# Patient Record
Sex: FEMALE | URBAN - METROPOLITAN AREA
[De-identification: names, ages, dates, MRNs, and addresses within clinical notes are randomized per-mention and may not be internally consistent; named-entity substitution may affect disease eponyms.]

---

## 2020-06-03 ENCOUNTER — APPOINTMENT (OUTPATIENT)
Dept: URBAN - METROPOLITAN AREA CLINIC 273 | Age: 20
Setting detail: DERMATOLOGY
End: 2020-06-03

## 2020-06-03 DIAGNOSIS — L70.0 ACNE VULGARIS: ICD-10-CM

## 2020-06-03 PROCEDURE — 99213 OFFICE O/P EST LOW 20 MIN: CPT

## 2020-06-03 PROCEDURE — OTHER PRESCRIPTION: OTHER

## 2020-06-03 RX ORDER — TRETIONIN 0.5 MG/G
CREAM TOPICAL
Qty: 1 | Refills: 11 | Status: ERX | COMMUNITY
Start: 2020-06-03

## 2022-08-21 ENCOUNTER — EMERGENCY (EMERGENCY)
Facility: HOSPITAL | Age: 22
LOS: 1 days | Discharge: HOME | End: 2022-08-21
Attending: EMERGENCY MEDICINE | Admitting: EMERGENCY MEDICINE

## 2022-08-21 VITALS
HEIGHT: 65 IN | OXYGEN SATURATION: 96 % | RESPIRATION RATE: 20 BRPM | SYSTOLIC BLOOD PRESSURE: 136 MMHG | DIASTOLIC BLOOD PRESSURE: 89 MMHG | HEART RATE: 83 BPM | WEIGHT: 250 LBS | TEMPERATURE: 99 F

## 2022-08-21 VITALS
TEMPERATURE: 98 F | OXYGEN SATURATION: 98 % | SYSTOLIC BLOOD PRESSURE: 121 MMHG | DIASTOLIC BLOOD PRESSURE: 71 MMHG | HEART RATE: 77 BPM | RESPIRATION RATE: 18 BRPM

## 2022-08-21 DIAGNOSIS — Z20.822 CONTACT WITH AND (SUSPECTED) EXPOSURE TO COVID-19: ICD-10-CM

## 2022-08-21 DIAGNOSIS — T42.4X2A POISONING BY BENZODIAZEPINES, INTENTIONAL SELF-HARM, INITIAL ENCOUNTER: ICD-10-CM

## 2022-08-21 DIAGNOSIS — F43.10 POST-TRAUMATIC STRESS DISORDER, UNSPECIFIED: ICD-10-CM

## 2022-08-21 DIAGNOSIS — F32.A DEPRESSION, UNSPECIFIED: ICD-10-CM

## 2022-08-21 DIAGNOSIS — R45.851 SUICIDAL IDEATIONS: ICD-10-CM

## 2022-08-21 DIAGNOSIS — F17.210 NICOTINE DEPENDENCE, CIGARETTES, UNCOMPLICATED: ICD-10-CM

## 2022-08-21 DIAGNOSIS — F41.9 ANXIETY DISORDER, UNSPECIFIED: ICD-10-CM

## 2022-08-21 DIAGNOSIS — F12.99 CANNABIS USE, UNSPECIFIED WITH UNSPECIFIED CANNABIS-INDUCED DISORDER: ICD-10-CM

## 2022-08-21 LAB
ALBUMIN SERPL ELPH-MCNC: 3.5 G/DL — SIGNIFICANT CHANGE UP (ref 3.4–5)
ALP SERPL-CCNC: 103 U/L — SIGNIFICANT CHANGE UP (ref 40–120)
ALT FLD-CCNC: 21 U/L — SIGNIFICANT CHANGE UP (ref 12–42)
AMPHET UR-MCNC: NEGATIVE — SIGNIFICANT CHANGE UP
ANION GAP SERPL CALC-SCNC: 8 MMOL/L — LOW (ref 9–16)
APAP SERPL-MCNC: 2 UG/ML — LOW (ref 10–30)
APPEARANCE UR: CLEAR — SIGNIFICANT CHANGE UP
AST SERPL-CCNC: 20 U/L — SIGNIFICANT CHANGE UP (ref 15–37)
BARBITURATES UR SCN-MCNC: NEGATIVE — SIGNIFICANT CHANGE UP
BASOPHILS # BLD AUTO: 0.08 K/UL — SIGNIFICANT CHANGE UP (ref 0–0.2)
BASOPHILS NFR BLD AUTO: 0.7 % — SIGNIFICANT CHANGE UP (ref 0–2)
BENZODIAZ UR-MCNC: NEGATIVE — SIGNIFICANT CHANGE UP
BILIRUB SERPL-MCNC: 0.2 MG/DL — SIGNIFICANT CHANGE UP (ref 0.2–1.2)
BILIRUB UR-MCNC: NEGATIVE — SIGNIFICANT CHANGE UP
BUN SERPL-MCNC: 12 MG/DL — SIGNIFICANT CHANGE UP (ref 7–23)
CALCIUM SERPL-MCNC: 9.1 MG/DL — SIGNIFICANT CHANGE UP (ref 8.5–10.5)
CHLORIDE SERPL-SCNC: 106 MMOL/L — SIGNIFICANT CHANGE UP (ref 96–108)
CO2 SERPL-SCNC: 23 MMOL/L — SIGNIFICANT CHANGE UP (ref 22–31)
COCAINE METAB.OTHER UR-MCNC: NEGATIVE — SIGNIFICANT CHANGE UP
COLOR SPEC: YELLOW — SIGNIFICANT CHANGE UP
CREAT SERPL-MCNC: 0.72 MG/DL — SIGNIFICANT CHANGE UP (ref 0.5–1.3)
DIFF PNL FLD: NEGATIVE — SIGNIFICANT CHANGE UP
EGFR: 120 ML/MIN/1.73M2 — SIGNIFICANT CHANGE UP
EOSINOPHIL # BLD AUTO: 0.33 K/UL — SIGNIFICANT CHANGE UP (ref 0–0.5)
EOSINOPHIL NFR BLD AUTO: 2.8 % — SIGNIFICANT CHANGE UP (ref 0–6)
ETHANOL SERPL-MCNC: <3 MG/DL — SIGNIFICANT CHANGE UP
GLUCOSE SERPL-MCNC: 88 MG/DL — SIGNIFICANT CHANGE UP (ref 70–99)
GLUCOSE UR QL: NEGATIVE — SIGNIFICANT CHANGE UP
HCG SERPL-ACNC: <1 MIU/ML — SIGNIFICANT CHANGE UP
HCT VFR BLD CALC: 40.4 % — SIGNIFICANT CHANGE UP (ref 34.5–45)
HGB BLD-MCNC: 12.8 G/DL — SIGNIFICANT CHANGE UP (ref 11.5–15.5)
IMM GRANULOCYTES NFR BLD AUTO: 0.3 % — SIGNIFICANT CHANGE UP (ref 0–1.5)
KETONES UR-MCNC: NEGATIVE — SIGNIFICANT CHANGE UP
LEUKOCYTE ESTERASE UR-ACNC: NEGATIVE — SIGNIFICANT CHANGE UP
LYMPHOCYTES # BLD AUTO: 3.56 K/UL — HIGH (ref 1–3.3)
LYMPHOCYTES # BLD AUTO: 30 % — SIGNIFICANT CHANGE UP (ref 13–44)
MCHC RBC-ENTMCNC: 26.7 PG — LOW (ref 27–34)
MCHC RBC-ENTMCNC: 31.7 GM/DL — LOW (ref 32–36)
MCV RBC AUTO: 84.2 FL — SIGNIFICANT CHANGE UP (ref 80–100)
METHADONE UR-MCNC: NEGATIVE — SIGNIFICANT CHANGE UP
MONOCYTES # BLD AUTO: 0.57 K/UL — SIGNIFICANT CHANGE UP (ref 0–0.9)
MONOCYTES NFR BLD AUTO: 4.8 % — SIGNIFICANT CHANGE UP (ref 2–14)
NEUTROPHILS # BLD AUTO: 7.3 K/UL — SIGNIFICANT CHANGE UP (ref 1.8–7.4)
NEUTROPHILS NFR BLD AUTO: 61.4 % — SIGNIFICANT CHANGE UP (ref 43–77)
NITRITE UR-MCNC: NEGATIVE — SIGNIFICANT CHANGE UP
NRBC # BLD: 0 /100 WBCS — SIGNIFICANT CHANGE UP (ref 0–0)
OPIATES UR-MCNC: NEGATIVE — SIGNIFICANT CHANGE UP
PCP SPEC-MCNC: SIGNIFICANT CHANGE UP
PCP UR-MCNC: NEGATIVE — SIGNIFICANT CHANGE UP
PH UR: 6 — SIGNIFICANT CHANGE UP (ref 5–8)
PLATELET # BLD AUTO: 368 K/UL — SIGNIFICANT CHANGE UP (ref 150–400)
POTASSIUM SERPL-MCNC: 4 MMOL/L — SIGNIFICANT CHANGE UP (ref 3.5–5.3)
POTASSIUM SERPL-SCNC: 4 MMOL/L — SIGNIFICANT CHANGE UP (ref 3.5–5.3)
PROT SERPL-MCNC: 7.6 G/DL — SIGNIFICANT CHANGE UP (ref 6.4–8.2)
PROT UR-MCNC: NEGATIVE MG/DL — SIGNIFICANT CHANGE UP
RBC # BLD: 4.8 M/UL — SIGNIFICANT CHANGE UP (ref 3.8–5.2)
RBC # FLD: 13.7 % — SIGNIFICANT CHANGE UP (ref 10.3–14.5)
SALICYLATES SERPL-MCNC: 1.7 MG/DL — LOW (ref 2.8–20)
SARS-COV-2 RNA SPEC QL NAA+PROBE: SIGNIFICANT CHANGE UP
SODIUM SERPL-SCNC: 137 MMOL/L — SIGNIFICANT CHANGE UP (ref 132–145)
SP GR SPEC: 1.01 — SIGNIFICANT CHANGE UP (ref 1–1.03)
THC UR QL: POSITIVE
TSH SERPL-MCNC: 0.58 UIU/ML — SIGNIFICANT CHANGE UP (ref 0.36–3.74)
UROBILINOGEN FLD QL: 0.2 E.U./DL — SIGNIFICANT CHANGE UP
WBC # BLD: 11.88 K/UL — HIGH (ref 3.8–10.5)
WBC # FLD AUTO: 11.88 K/UL — HIGH (ref 3.8–10.5)

## 2022-08-21 PROCEDURE — 93010 ELECTROCARDIOGRAM REPORT: CPT

## 2022-08-21 PROCEDURE — 90792 PSYCH DIAG EVAL W/MED SRVCS: CPT | Mod: 95

## 2022-08-21 PROCEDURE — 99218: CPT

## 2022-08-21 NOTE — ED PROVIDER NOTE - OBJECTIVE STATEMENT
21yo F hx of anxiety/ depression, on sertraline and klonopin, presents with attempt at self harm tonight.  Pt states she was upset about an interaction with her mother and her brother, and took 20 tablets of 0.5mg klonopin in her mouth, held them for a few minutes, then spit them out.  States some of the tablets were melted but not all.  Pt then put another approx 6 tablets in her mouth, but spit them out as well. 21yo F hx of anxiety/ depression, on sertraline and klonopin, presents with attempt at self harm tonight.  Pt states she was upset about an interaction with her mother and her brother, and took 20 tablets of 0.5mg klonopin in her mouth, held them for a few minutes, then spit them out.  States some of the tablets were melted but not all.  Pt then put another approx 6 tablets in her mouth, but spit them out as well.  Pt then called her psychiatrist who told her to go to ED.  No drugs or etoh.  No co ingestions.  No HI or AVH.  Does not want to end her life.

## 2022-08-21 NOTE — ED BEHAVIORAL HEALTH NOTE - BEHAVIORAL HEALTH NOTE
“Collateral (Bianca, mom and brother) has requested that the information provided remain confidential: Yes [  ] No [x  ]     Collateral (Bianca, mom and brother) has provided information that patient is/may be unaware of: Yes [  ] No [ x ]”             “Patient gives permission to obtain collateral from _____:     (  ) Yes     (  x)  No     Rationale for overriding objection               (  ) Lack of capacity. Details: ________               (  x) Assessing risk of danger to self/others. Details: __Pt was brought to ED for possible SA______            Rationale for selecting specific collateral source               ( x ) Potential to impact risk of danger to self/others and no alternative equivalent. Details: _Contacted pt’s mom who brought pt to the ED____”              ========================     FOR EACH COLLATERAL     ========================     NAME: Bianca     NUMBER: [phone]     RELATIONSHIP: Mother and Brother      RELIABILITY: Good     COMMENTS: Per collaterals, they deny having safety concerns and reports someone will be able to stay with pt once she is discharged. In addition, reports pt’s outside providers are aware and will be making a sooner appointment to be seen.      ========================     PATIENT DEMOGRAPHICS: Patient is a 22F, single, no children/non-caregiver, college graduate, unemployed, and domiciled alone.      ========================     HPI     BASELINE FUNCTIONING: Per collaterals, at baseline pt is “super functional, and motivated”.     DATE HPI STARTED: Per collaterals, withing the last 48 hours. Prior to Friday pt was doing well and there were no conflicts/stressors present.      DECOMPENSATION: Per collaterals, there are interpersonal conflicts with the pt and family (dad, mom and brother). Collaterals report on Friday pt got into a verbal “back and forth” with brother and then again on Saturday with mom. Per collaterals, they have been suggesting to go to family therapy to work on their relationship and conflicts, however pt is not agreeable and that was some of the context of the verbal back and forth this past weekend. Per collaterals, pt texted father and stated she took twenty .5mg of Klonopin pills and spit them out and then again took six .5 mg Klonopin pills and spit them out again. Per collaterals, they don’t believe it was an act to end her life. Collaterals report pt has been engaged in treatment for the last year; reports pt sees a psychiatrist Dr. Ames and a psychologist (name unknown) through private practice.       SUICIDALITY: Denies      VIOLENCE: Denies      SUBSTANCE: “socially drinks with friends”.      ========================     PAST PSYCHIATRIC HISTORY     ========================     MAIN PSYCHIATRIC DIAGNOSIS: unknown at this time by family     PSYCHIATRIC HOSPITALIZATIONS: Denies     SUICIDALITY: Denies     VIOLENCE: Denies     SUBSTANCE USE: Denies      ==============     OTHER HISTORY     ==============     CURRENT MEDICATION: Klonopin and Zoloft, collaterals were unsure of mg.      MEDICAL HISTORY: N/A     ALLERGIES: N/A     LEGAL ISSUES: Denies     FIREARM ACCESS: Denies     SOCIAL HISTORY: Pt is a college graduate, has not been employed for the last few months. Per collaterals, pt is supposed to move to [place] in 2 weeks.      FAMILY HISTORY: N/A     DEVELOPMENTAL HISTORY: N/A          ------------------------------------------------     COVID Exposure Screen- collateral (i.e. third-party, chart review, belongings, etc; include EMS and ED staff)      ---------------------------------------------------     1.    Has the patient had a COVID-19 test in the last 90 days? Unknown.      2. Has the patient tested positive for COVID-19 antibodies? Unknown.      3.Has the patient received 2 doses of the COVID-19 vaccine?  Unknown.      4. In the past 10 days, has the patient been around anyone with a positive COVID-19 test?* Unknown.      5.Has the patient been out of [place] within the past 10 days? Unknown.

## 2022-08-21 NOTE — ED BEHAVIORAL HEALTH ASSESSMENT NOTE - DESCRIPTION
See BH note by Julia Rodriguez see ED provider note Parents are from [place] and survived the [place]; grew up in [place], [place]; graduated from [employer] with BA in Philosophy and double minor in sociology and screen writing

## 2022-08-21 NOTE — ED BEHAVIORAL HEALTH ASSESSMENT NOTE - NSBHSATHC_PSY_A_CORE FT
Pt calling in to follow up on PA for medication. She states that she will follow up with the pharmacy, but hasn't heard anything from the office. Please advise. Smokes cannabis "wax" using a vape pen daily

## 2022-08-21 NOTE — ED BEHAVIORAL HEALTH ASSESSMENT NOTE - RISK ASSESSMENT
Patient has chronic risk of suicide with history of PTSD, impulsivity with current self-interrupted suicide attempt, and chronic, intermittent SI. 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 The patient is able to engage in meaningful safety planning and will be checking in with psychiatrist tomorrow. Low Acute Suicide Risk
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
## 2022-08-21 NOTE — ED PROVIDER NOTE - CLINICAL SUMMARY MEDICAL DECISION MAKING FREE TEXT BOX
23yo F w depression/ anxiety presents with attempt at self harm by intentional overdose on klonopin, but states she spit out all the tablets she attempted to ingest.  On exam afebrile, VSS, well appearing, appropriately tearful.  Not responding to internal stimuli.  EKG w nml intervals.  Plan for psych labs, 1:1 obs, psych consult.  Mom in waiting room.      Mom Akanksha Tomasa [phone]  Brother Andrei Tomasa [phone]

## 2022-08-21 NOTE — ED PROVIDER NOTE - NS ED ROS FT
Constitutional:  No fever, No chills, No night sweats  Eyes:  No visual changes, No discharge, No redness  ENMT:  No epistaxis, no nasal congestion, no throat pain, no difficulty swallowing  CV:  No chest pain, No palpitations, No peripheral edema  Resp:  No cough, No shortness of breath  GI:  No abdominal pain, No vomiting, No diarrhea  MSK:  No neck pain or stiffness, No joint swelling or pain, No back pain  Neuro: no loss of consciousness, no gait abnormality, no headache, no sensory deficits, and no weakness.  Skin:  No abrasions, no lesions, no rashes  Psych:  +depression

## 2022-08-21 NOTE — ED CDU PROVIDER DISPOSITION NOTE - CLINICAL COURSE
Pt seen by psych.  Cleared for dc home with plan for follow up tomorrow with pt's own psychiatrist.  Safety plan established.

## 2022-08-21 NOTE — ED ADULT TRIAGE NOTE - CHIEF COMPLAINT QUOTE
Pt walked into ER with Mother reporting that she took x20 0.5 pills of Klonopin and spit them out then taking x6 more about 1 hour ago. Pt states she wanted to hurt herself but would not elaborate further at triage.

## 2022-08-21 NOTE — ED BEHAVIORAL HEALTH NOTE - BEHAVIORAL HEALTH NOTE
===================      PRE-HOSPITAL COURSE      ===================      SOURCE:  Secondhand EMR documentation.       DETAILS:  Patient presents self to ED advised from psychiatrist; chief complaint of aborted SA.     ===========      ED COURSE:      ============      SOURCE:  RN and secondhand EMR documentation.         ARRIVAL:  Patient was cooperative with hospital protocol and allowed for gowning/wanding without incident. Patient presents with good hygiene/grooming. Patient placed on 1:1 In private room for consult.         BELONGINGS:  None notable.        BEHAVIOR: Blood/urine provided for routine labs without incident.  Per charting patient endorses earlier attempt to harm self by ingesting 20 .5mg Klonopin then spitting them out, then same with 6 more tabs before spitting them out. No HI/AH/VH elicited. Patient presents as tearful and not wanting incident to go on record. Patient is alert, oriented, and makes eye contact; speech of normal volume/rate accompanied by logical thought process. Patient has been in hospital bed while in ED.      TREATMENT: Patient did not require medication intervention while in ED.      VISITORS:  Patient presently unaccompanied by social supports while in ED; mother remains in waiting room.
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
## 2022-08-21 NOTE — ED BEHAVIORAL HEALTH ASSESSMENT NOTE - OTHER
[facility] Sridevi Gonzalez deferred Psychiatrist, Dr. Rasheed Ames "exhaustion, regret"

## 2022-08-21 NOTE — ED PROVIDER NOTE - PHYSICAL EXAMINATION
Constitutional: awake and alert, in no acute distress  HEENT: head normocephalic and atraumatic. moist mucous membranes  Eyes: extraocular movements intact, normal conjunctiva  Neck: supple, normal ROM  Cardiovascular: regular rate   Pulmonary: no respiratory distress  Gastrointestinal: abdomen flat and nondistended  Skin: warm, dry, normal for ethnicity  Musculoskeletal: no edema, no deformity  Neurological: oriented x4, no focal neurologic deficit.   Psychiatric: calm and cooperative, appropriately tearful

## 2022-08-21 NOTE — ED BEHAVIORAL HEALTH ASSESSMENT NOTE - NS ED BHA AXIS I PRIMARY CODE FT
REVIEW OF SYSTEMS:  GEN: no fever,    no chills  RESP:  SOB,   cough  CVS: no chest pain,   no palpitations  GI: no abdominal pain,   no nausea,   no vomiting,   no constipation,   no diarrhea  : no dysuria,   no frequency  NEURO: no headache,   no dizziness  PSYCH: no depression,   not anxious  Derm : no rash F43.10
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
## 2022-08-21 NOTE — ED CDU PROVIDER DISPOSITION NOTE - PATIENT PORTAL LINK FT
You can access the FollowMyHealth Patient Portal offered by [facility] by registering at the following website: [email or web address]. By joining [facility]’s FollowMyHealth portal, you will also be able to view your health information using other applications (apps) compatible with our system.

## 2022-08-21 NOTE — ED BEHAVIORAL HEALTH ASSESSMENT NOTE - SAFETY PLAN ADDT'L DETAILS
Safety plan discussed with.../Education provided regarding environmental safety / lethal means restriction/Provision of National Suicide Prevention Lifeline 0-642-130-GDEC (8177)

## 2022-08-21 NOTE — ED BEHAVIORAL HEALTH ASSESSMENT NOTE - NSSUICPROTFACT_PSY_ALL_CORE
Identifies reasons for living/Positive therapeutic relationships/Ability to cope with stress/Frustration tolerance/Other

## 2022-08-21 NOTE — ED BEHAVIORAL HEALTH ASSESSMENT NOTE - SUMMARY
21 yo F, domiciled in an apartment lives alone, single, parents subsidize income, works as a sitcom writer and currently unemployed, psychiatric history of complex PTSD and anxiety, engaged in outpatient mental health, no psychiatric hospitalizations, no prior suicide attempts, no self directed violence by cutting, no significant medical history, who presents BIB self with Mom at recommendation of her outpatient psychiatrist in the setting of self-interrupted suicide attempt.    Patient acted on intrusive suicidal thoughts to die by impulsively putting several Klonopin tablets in her mouth on two occasions in the same evening without swallowing them and spitting them out because she did not want to die. The thoughts were in the setting of arguing with her Mom and brother throughout the weekend who were in town to help the patient pack in preparation of moving to [place] in a couple weeks. The family has no safety concerns and the safety plan was completed in collaboration with patient's psychiatrist, who is planning a wellness check in call tomorrow to assess need for medication and mental status.

## 2022-08-21 NOTE — ED CDU PROVIDER INITIAL DAY NOTE - OBJECTIVE STATEMENT
23yo F hx of anxiety/ depression, on sertraline and klonopin, presents with attempt at self harm tonight.  Pt states she was upset about an interaction with her mother and her brother, and took 20 tablets of 0.5mg klonopin in her mouth, held them for a few minutes, then spit them out.  States some of the tablets were melted but not all.  Pt then put another approx 6 tablets in her mouth, but spit them out as well.  Pt then called her psychiatrist who told her to go to ED.  No drugs or etoh.  No co ingestions.  No HI or AVH.  Does not want to end her life.

## 2022-08-21 NOTE — ED CDU PROVIDER DISPOSITION NOTE - NSFOLLOWUPINSTRUCTIONS_ED_ALL_ED_FT
Suicidal Feelings: How to Help Yourself      Suicide is when you end your own life. There are many things you can do to help yourself feel better when struggling with these feelings. Many services and people are available to support you and others who struggle with similar feelings.     If you ever feel like you may hurt yourself or others, or have thoughts about taking your own life, get help right away. To get help:   • Call your local emergency services (911 in the [place]       • [facility] and [facility] services helpline (211 in the [place]       • Go to your nearest emergency department.     • Call a suicide hotline to speak with a trained counselor. The following suicide hotlines are available in the [place]:  •3-962-806-TALK (1-574.826.3531).      •7-929-AYRVDXV (1-312.561.5630).      •1-529.765.6036. This is a hotline for [place]iny[place] speakers.      •1-274.374.4063. This is a hotline for TTY users.      •3-199-3-U-JACOB (1-211.610.5696). This is a hotline for lesbian, noguera, bisexual, transgender, or questioning youth.      •For a list of hotlines in [place], visit www.suicide.org/hotlines/international/dskpvu-fttedtg-ajdmgmyq.html      • Contact a crisis center or a local suicide prevention center. To find a crisis center or suicide prevention center:  •Call your local hospital, clinic, community service organization, mental health center, social service provider, or health department. Ask for help with connecting to a crisis center.      •For a list of crisis centers in the [place], visit: suicidepreventionlifeline.org      •For a list of crisis centers in [place], visit: suicideprevention.ca          How to help yourself feel better     •Promise yourself that you will not do anything extreme when you have suicidal feelings. Remember the times you have felt hopeful. Many people have gotten through suicidal thoughts and feelings, and you can too. If you have had these feelings before, remind yourself that you can get through them again.      •Let family, friends, teachers, or counselors know how you are feeling. Try not to separate yourself from those who care about you and want to help you. Talk with someone every day, even if you do not feel sociable. Face-to-face conversation is best to help them understand your feelings.      •Contact a mental health care provider and work with this person regularly.      •Make a safety plan that you can follow during a crisis. Include phone numbers of suicide prevention hotlines, mental health professionals, and trusted friends and family members you can call during an emergency. Save these numbers on your phone.      •If you are thinking of taking a lot of medicine, give your medicine to someone who can give it to you as prescribed. If you are on antidepressants and are concerned you will overdose, tell your health care provider so that he or she can give you safer medicines.      •Try to stick to your routines and follow a schedule every day. Make self-care a priority.      •Make a list of realistic goals, and cross them off when you achieve them. Accomplishments can give you a sense of worth.      •Wait until you are feeling better before doing things that you find difficult or unpleasant.      •Do things that you have always enjoyed to take your mind off your feelings. Try reading a book, or listening to or playing music. Spending time outside, in nature, may help you feel better.        Follow these instructions at home:     •Visit your primary health care provider every year for a checkup.      •Work with a mental health care provider as needed.      •Eat a well-balanced diet, and eat regular meals.      •Get plenty of rest.      •Exercise if you are able. Just 30 minutes of exercise each day can help you feel better.      •Take over-the-counter and prescription medicines only as told by your health care provider. Ask your mental health care provider about the possible side effects of any medicines you are taking.      • Do not use alcohol or drugs, and remove these substances from your home.      •Remove weapons, poisons, knives, and other deadly items from your home.        General recommendations    •Keep your living space well lit.      •When you are feeling well, write yourself a letter with tips and support that you can read when you are not feeling well.      •Remember that life's difficulties can be sorted out with help. Conditions can be treated, and you can learn behaviors and ways of thinking that will help you.        Where to find more information    •National Suicide Prevention Lifeline: www.suicidepreventionlifeline.org      •Hopeline: www.hopeline.com      •American Foundation for Suicide Prevention: www.afsp.org      •The [employer] Project (for lesbian, noguera, bisexual, transgender, or questioning youth): www.thetrevorproject.org      •National [place] of Mental Health: https://www.nimh.nih.gov/health/topics/suicide-prevention        Contact a health care provider if:    •You feel as though you are a burden to others.      •You feel agitated, angry, vengeful, or have extreme mood swings.      •You have withdrawn from family and friends.        Get help right away if:    •You are talking about suicide or wishing to die.      •You start making plans for how to commit suicide.      •You feel that you have no reason to live.      •You start making plans for putting your affairs in order, saying goodbye, or giving your possessions away.      •You feel guilt, shame, or unbearable pain, and it seems like there is no way out.      •You are frequently using drugs or alcohol.      •You are engaging in risky behaviors that could lead to death.      If you have any of these symptoms, get help right away. Call emergency services, go to your nearest emergency department or crisis center, or call a suicide crisis helpline.       Summary    •Suicide is when you take your own life.      •Promise yourself that you will not do anything extreme when you have suicidal feelings.      •Let family, friends, teachers, or counselors know how you are feeling.      •Get help right away if you start making plans for how to commit suicide.      This information is not intended to replace advice given to you by your health care provider. Make sure you discuss any questions you have with your health care provider.

## 2022-08-22 DIAGNOSIS — F43.10 POST-TRAUMATIC STRESS DISORDER, UNSPECIFIED: ICD-10-CM

## 2023-03-21 NOTE — ED ADULT TRIAGE NOTE - NS_BH TRG QUESTION8_ED_ALL_ED
Depression (without Suicidality or Psychosis) Patient with chest pain as above, labs and studies appreciated and discussed with patient and family, will discharge with outpatient follow-up.  Patient counseled regarding conditions which should prompt return

## 2023-06-02 NOTE — ED ADULT NURSE NOTE - BRAND OF COVID-19 VACCINATION
Addended by: RIVERA ROBLES on: 6/2/2023 12:21 PM     Modules accepted: Orders     Moderna dose 1 and 2